# Patient Record
(demographics unavailable — no encounter records)

---

## 2025-03-06 NOTE — ASSESSMENT
[FreeTextEntry1] : Hyperlipidemia.  Want LDL less than 100.   Triglyceride, HDL, and LDL results from last week were reviewed with patient.        Patient refuses cholesterol medication.  Patient wants to repeat lipid in 3 months. Hemoglobin A1c result from last week was reviewed with patient. CT calcium score.  Results are pending Follow-up in 3 months If lipid panel is not improved, will need cholesterol medication Osteopenia.  DEXA in October 2025 Prevnar 20 today Patient reports seeing gynecologist (JESSEE Parisi) last year Decreased hearing.  Followed by ENT hand pain.  Followed by orthopedics Mammogram was done 1 year ago-per GYN COVID-19  booster vaccine is recommended once a year in fall.

## 2025-03-06 NOTE — HISTORY OF PRESENT ILLNESS
[Right] : right hand dominant [FreeTextEntry1] : Pt is a 65 y/o female c/o bilateral hand pain x 4 months.  Denies injury.  The pain is in the CMC joint.  She has stiffness.  It is difficult to  anything.  She has pain with lifting.  She has pain with twisting or opening jars. She wore a wrist brace and took Tylenol for pain management. She received a steroid injection in the left thumb CMC joint . The injection was helpful.  Today on 03/06/2025, patient is here c/o right hand pain. The cortisone injections to her right basal joints from last visit, 08/01/2024, did provide temporary relief. However, her symptoms have recurred. The pain is in the bilateral CMC joints. She is requesting cortisone injections today to her bilateral hands.

## 2025-03-06 NOTE — COUNSELING
[de-identified] : Discussed with patient importance of healthy diet, regular exercise (> 2.5 hours/week), and maintaining healthy weight.

## 2025-03-06 NOTE — COUNSELING
[de-identified] : Discussed with patient importance of healthy diet, regular exercise (> 2.5 hours/week), and maintaining healthy weight.

## 2025-03-06 NOTE — DISCUSSION/SUMMARY
[FreeTextEntry1] :  The underlying pathophysiology was reviewed with the patient. XR films were reviewed with the patient. Discussed at length the nature of the patient's condition.   Patient was advised to take OTC medications and topical analgesic for pain management.  The patient wishes to proceed with a cortisone injection at this time. Under sterile precautions, an injection of 0.5 cc 1% lidocaine with 0.25 cc of Kenalog and 0.25 cc of Dexamethasone was administered into the right basal joint. The patient tolerated the procedure well. Rest and apply ice.  The patient wishes to proceed with a cortisone injection at this time. Under sterile precautions, an injection of 0.5 cc 1% lidocaine with 0.25 cc of Kenalog and 0.25 cc of Dexamethasone was administered into the left basal joint. The patient tolerated the procedure well. Rest and apply ice.  Activity modification was discussed in great detail. No restrictions on ADLs.  All questions answered, understanding verbalized. Patient in agreement with plan of care. Patient was advised to follow up as needed.  If the patient begins to experience any changes or severe exacerbation of her symptoms, she should reach out to me as soon as possible.

## 2025-03-06 NOTE — PHYSICAL EXAM
[de-identified] : Patient is WDWN, alert, and in no acute distress. Breathing is unlabored. She is grossly oriented to person, place, and time.  She was accompanied by Her daughter today.  Right Hand No discoloration Thumb CMC edema and tenderness present full ROM with pain sensation is intact All intrinsic and extrinsic hand muscles 5/5.  No joint instability on provocative testing.  Sensation is intact to light touch.  There are no skin lesions or discoloration. 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

## 2025-03-06 NOTE — HEALTH RISK ASSESSMENT
[Good] : ~his/her~  mood as  good [No] : In the past 12 months have you used drugs other than those required for medical reasons? No [No falls in past year] : Patient reported no falls in the past year [0] : 2) Feeling down, depressed, or hopeless: Not at all (0) [Yes] : Reviewed medication list for presence of high-risk medications. [Benzodiazepines] : benzodiazepines [Opioids] : opioids [Never] : Never [Patient reported mammogram was normal] : Patient reported mammogram was normal [Patient reported PAP Smear was normal] : Patient reported PAP Smear was normal [Patient reported bone density results were abnormal] : Patient reported bone density results were abnormal [Patient reported colonoscopy was normal] : Patient reported colonoscopy was normal [HIV test declined] : HIV test declined [None] : None [With Significant Other] : lives with significant other [College] : College [] :  [Feels Safe at Home] : Feels safe at home [Fully functional (bathing, dressing, toileting, transferring, walking, feeding)] : Fully functional (bathing, dressing, toileting, transferring, walking, feeding) [Fully functional (using the telephone, shopping, preparing meals, housekeeping, doing laundry, using] : Fully functional and needs no help or supervision to perform IADLs (using the telephone, shopping, preparing meals, housekeeping, doing laundry, using transportation, managing medications and managing finances) [Smoke Detector] : smoke detector [Seat Belt] :  uses seat belt [Designated Healthcare Proxy] : Designated healthcare proxy [Name: ___] : Health Care Proxy's Name: [unfilled]  [Relationship: ___] : Relationship: [unfilled] [de-identified] : walk [de-identified] : low fat [[id number]] : 0 [Change in mental status noted] : No change in mental status noted [Language] : denies difficulty with language [Behavior] : denies difficulty with behavior [Learning/Retaining New Information] : denies difficulty learning/retaining new information [Handling Complex Tasks] : denies difficulty handling complex tasks [Reasoning] : denies difficulty with reasoning [Spatial Ability and Orientation] : denies difficulty with spatial ability and orientation [Employed] : employed [de-identified] : [employer]

## 2025-03-06 NOTE — HISTORY OF PRESENT ILLNESS
[FreeTextEntry1] : Follow-up for hyperlipidemia, high glucose, and osteopenia [de-identified] :  See "CC" sheet

## 2025-03-06 NOTE — ADDENDUM
[FreeTextEntry1] : I, Matty Griffin wrote this note acting as a scribe for Dr. Danilo Virk on 03/06/2025.   All medical record entries made by the Scribe were at my, Dr. Danilo Virk M.D., direction and personally dictated by me on 03/06/2025. I have personally reviewed the chart and agree that the record accurately reflects my personal performance of the history, physical exam, assessment and plan

## 2025-03-06 NOTE — PHYSICAL EXAM
[No Acute Distress] : no acute distress [Well-Appearing] : well-appearing [Supple] : supple [No CVA Tenderness] : no CVA  tenderness [Speech Grossly Normal] : speech grossly normal [Normal Affect] : the affect was normal [Normal Mood] : the mood was normal [No Respiratory Distress] : no respiratory distress  [No Accessory Muscle Use] : no accessory muscle use [Clear to Auscultation] : lungs were clear to auscultation bilaterally [Normal Rate] : normal rate  [Regular Rhythm] : with a regular rhythm [No Edema] : there was no peripheral edema [Soft] : abdomen soft [Non Tender] : non-tender [Normal Posterior Cervical Nodes] : no posterior cervical lymphadenopathy [Normal Anterior Cervical Nodes] : no anterior cervical lymphadenopathy

## 2025-03-06 NOTE — PHYSICAL EXAM
[de-identified] : Patient is WDWN, alert, and in no acute distress. Breathing is unlabored. She is grossly oriented to person, place, and time.  She was accompanied by Her daughter today.  Right Hand No discoloration Thumb CMC edema and tenderness present full ROM with pain sensation is intact All intrinsic and extrinsic hand muscles 5/5.  No joint instability on provocative testing.  Sensation is intact to light touch.  There are no skin lesions or discoloration. 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

## 2025-03-06 NOTE — HISTORY OF PRESENT ILLNESS
[FreeTextEntry1] : Follow-up for hyperlipidemia, high glucose, and osteopenia [de-identified] :  See "CC" sheet

## 2025-03-06 NOTE — HEALTH RISK ASSESSMENT
[Good] : ~his/her~  mood as  good [No] : In the past 12 months have you used drugs other than those required for medical reasons? No [No falls in past year] : Patient reported no falls in the past year [0] : 2) Feeling down, depressed, or hopeless: Not at all (0) [Yes] : Reviewed medication list for presence of high-risk medications. [Benzodiazepines] : benzodiazepines [Opioids] : opioids [Never] : Never [Patient reported mammogram was normal] : Patient reported mammogram was normal [Patient reported PAP Smear was normal] : Patient reported PAP Smear was normal [Patient reported bone density results were abnormal] : Patient reported bone density results were abnormal [Patient reported colonoscopy was normal] : Patient reported colonoscopy was normal [HIV test declined] : HIV test declined [None] : None [With Significant Other] : lives with significant other [College] : College [] :  [Feels Safe at Home] : Feels safe at home [Fully functional (bathing, dressing, toileting, transferring, walking, feeding)] : Fully functional (bathing, dressing, toileting, transferring, walking, feeding) [Fully functional (using the telephone, shopping, preparing meals, housekeeping, doing laundry, using] : Fully functional and needs no help or supervision to perform IADLs (using the telephone, shopping, preparing meals, housekeeping, doing laundry, using transportation, managing medications and managing finances) [Smoke Detector] : smoke detector [Seat Belt] :  uses seat belt [Designated Healthcare Proxy] : Designated healthcare proxy [Name: ___] : Health Care Proxy's Name: [unfilled]  [Relationship: ___] : Relationship: [unfilled] [de-identified] : walk [de-identified] : low fat [[id number]] : 0 [Change in mental status noted] : No change in mental status noted [Language] : denies difficulty with language [Behavior] : denies difficulty with behavior [Learning/Retaining New Information] : denies difficulty learning/retaining new information [Handling Complex Tasks] : denies difficulty handling complex tasks [Reasoning] : denies difficulty with reasoning [Spatial Ability and Orientation] : denies difficulty with spatial ability and orientation [Employed] : employed [de-identified] : [employer]

## 2025-07-08 NOTE — ASSESSMENT
[FreeTextEntry1] : Hyperlipidemia.  Want LDL less than 100.  Patient refuses cholesterol medication.    Triglyceride, HDL, and LDL results from last week were reviewed with patient. Hemoglobin A1c result from last week was reviewed with patient. Osteopenia.    DEXA in 1/26 Patient reminded to follow-up with gynecologist once every 2 years Hearing difficulty.  Patient wants second opinion.  Refer to ENT, Edvin Follow-up in 6 months COVID-19  booster vaccine is recommended once a year in fall.

## 2025-07-08 NOTE — COUNSELING
[de-identified] : Discussed with patient importance of healthy diet, regular exercise (> 2.5 hours/week), and maintaining healthy weight.

## 2025-07-08 NOTE — HISTORY OF PRESENT ILLNESS
[FreeTextEntry1] : Follow-up for osteopenia, hyperlipidemia, and high glucose [de-identified] :  See "intake form"